# Patient Record
Sex: FEMALE | Race: BLACK OR AFRICAN AMERICAN | Employment: UNEMPLOYED | URBAN - METROPOLITAN AREA
[De-identification: names, ages, dates, MRNs, and addresses within clinical notes are randomized per-mention and may not be internally consistent; named-entity substitution may affect disease eponyms.]

---

## 2017-01-25 ENCOUNTER — TELEPHONE (OUTPATIENT)
Dept: GASTROENTEROLOGY | Age: 52
End: 2017-01-25

## 2017-01-26 ENCOUNTER — TELEPHONE (OUTPATIENT)
Dept: GASTROENTEROLOGY | Age: 52
End: 2017-01-26

## 2017-01-27 ENCOUNTER — HOSPITAL ENCOUNTER (OUTPATIENT)
Dept: SURGERY | Age: 52
Discharge: HOME | End: 2017-03-08
Attending: INTERNAL MEDICINE | Admitting: INTERNAL MEDICINE

## 2017-02-03 ENCOUNTER — TELEPHONE (OUTPATIENT)
Dept: GASTROENTEROLOGY | Age: 52
End: 2017-02-03

## 2017-02-07 ENCOUNTER — TELEPHONE (OUTPATIENT)
Dept: GASTROENTEROLOGY | Age: 52
End: 2017-02-07

## 2019-03-27 ENCOUNTER — APPOINTMENT (OUTPATIENT)
Dept: GENERAL RADIOLOGY | Age: 54
End: 2019-03-27
Payer: MEDICARE

## 2019-03-27 ENCOUNTER — HOSPITAL ENCOUNTER (EMERGENCY)
Age: 54
Discharge: HOME | End: 2019-03-27
Payer: MEDICARE

## 2019-03-27 VITALS
HEIGHT: 66 IN | DIASTOLIC BLOOD PRESSURE: 76 MMHG | SYSTOLIC BLOOD PRESSURE: 121 MMHG | WEIGHT: 120 LBS | BODY MASS INDEX: 19.29 KG/M2 | OXYGEN SATURATION: 96 % | TEMPERATURE: 99.3 F | HEART RATE: 89 BPM | RESPIRATION RATE: 18 BRPM

## 2019-03-27 DIAGNOSIS — Z72.0 TOBACCO ABUSE: ICD-10-CM

## 2019-03-27 DIAGNOSIS — R05.9 COUGH: Primary | ICD-10-CM

## 2019-03-27 PROCEDURE — 6370000000 HC RX 637 (ALT 250 FOR IP): Performed by: PHYSICIAN ASSISTANT

## 2019-03-27 PROCEDURE — 99283 EMERGENCY DEPT VISIT LOW MDM: CPT

## 2019-03-27 PROCEDURE — 94664 DEMO&/EVAL PT USE INHALER: CPT

## 2019-03-27 PROCEDURE — 6360000002 HC RX W HCPCS: Performed by: PHYSICIAN ASSISTANT

## 2019-03-27 PROCEDURE — 94761 N-INVAS EAR/PLS OXIMETRY MLT: CPT

## 2019-03-27 PROCEDURE — 71045 X-RAY EXAM CHEST 1 VIEW: CPT

## 2019-03-27 PROCEDURE — 94640 AIRWAY INHALATION TREATMENT: CPT

## 2019-03-27 RX ORDER — AZITHROMYCIN 250 MG/1
TABLET, FILM COATED ORAL
Qty: 1 PACKET | Refills: 0 | Status: SHIPPED | OUTPATIENT
Start: 2019-03-27 | End: 2019-04-06

## 2019-03-27 RX ORDER — AZITHROMYCIN 250 MG/1
TABLET, FILM COATED ORAL
Qty: 1 PACKET | Refills: 0 | Status: SHIPPED | OUTPATIENT
Start: 2019-03-27 | End: 2019-03-27 | Stop reason: SDUPTHER

## 2019-03-27 RX ORDER — IPRATROPIUM BROMIDE AND ALBUTEROL SULFATE 2.5; .5 MG/3ML; MG/3ML
1 SOLUTION RESPIRATORY (INHALATION) ONCE
Status: COMPLETED | OUTPATIENT
Start: 2019-03-27 | End: 2019-03-27

## 2019-03-27 RX ORDER — ALBUTEROL SULFATE 90 UG/1
2 AEROSOL, METERED RESPIRATORY (INHALATION) EVERY 4 HOURS PRN
Qty: 1 INHALER | Refills: 1 | Status: SHIPPED | OUTPATIENT
Start: 2019-03-27 | End: 2020-03-03

## 2019-03-27 RX ADMIN — DEXAMETHASONE 10 MG: 2 TABLET ORAL at 16:10

## 2019-03-27 RX ADMIN — IPRATROPIUM BROMIDE AND ALBUTEROL SULFATE 1 AMPULE: .5; 3 SOLUTION RESPIRATORY (INHALATION) at 16:14

## 2019-03-27 ASSESSMENT — PAIN DESCRIPTION - DESCRIPTORS: DESCRIPTORS: ACHING

## 2019-03-27 ASSESSMENT — PAIN DESCRIPTION - FREQUENCY: FREQUENCY: CONTINUOUS

## 2019-03-27 ASSESSMENT — PAIN SCALES - GENERAL: PAINLEVEL_OUTOF10: 10

## 2019-03-27 ASSESSMENT — PAIN DESCRIPTION - LOCATION: LOCATION: GENERALIZED

## 2019-03-27 ASSESSMENT — PAIN DESCRIPTION - PAIN TYPE: TYPE: ACUTE PAIN

## 2019-03-27 NOTE — ED PROVIDER NOTES
Patient Identification  Ar Roy is a 48 y.o. female    Chief Complaint  Cough      HPI  (History provided by patient)  This is a 48 y.o. female who was brought in by self for chief complaint of cough. Onset was 3 weeks ago. Cough is productive of white chalky sputum. + mild SOB. Current smoker. No CP. No NV. Did have some diarrhea when symptoms began, none now. Granddaughter sick with flu. REVIEW OF SYSTEMS    Constitutional:  + subjective fever, chills  HENT:  Denies ear pain.  + ST  Eyes: Denies vision changes, eye pain  Cardiovascular:  Denies chest pain, syncope  Respiratory:  + shortness of breath, cough   GI:  Denies abdominal pain, nausea, vomiting  :  Denies dysuria, discharge  Musculoskeletal:  Denies back pain, joint pain  Skin:  Denies rash, pruritis  Neurologic:  Denies headache, focal weakness, or sensory changes     See HPI and nursing notes for additional information     I have reviewed the following nursing documentation:  Allergies: Allergies   Allergen Reactions    Sulfa Antibiotics        Past medical history:  has a past medical history of Arthritis and Cervical dysplasia. Past surgical history:  has a past surgical history that includes knee surgery; Cervix surgery; and Cataract removal (Bilateral, 02/2019). Home medications:   Prior to Admission medications    Medication Sig Start Date End Date Taking? Authorizing Provider   albuterol sulfate HFA (PROVENTIL HFA) 108 (90 Base) MCG/ACT inhaler Inhale 2 puffs into the lungs every 4 hours as needed for Wheezing or Shortness of Breath With spacer (and mask if indicated). Thanks.  3/27/19 4/26/19 Yes Sharan Turner PA-C   azithromycin (ZITHROMAX) 250 MG tablet Take 2 tablets (500 mg) on Day 1, followed by 1 tablet (250 mg) once daily on Days 2 through 5. 3/27/19 4/6/19 Yes Sharan Turner PA-C   Multiple Vitamins-Calcium (ONE-A-DAY WOMENS PO) Take 1 tablet by mouth daily    Historical Provider, MD Social history:  reports that she has been smoking cigarettes. She has been smoking about 0.50 packs per day. She has never used smokeless tobacco. She reports that she drinks alcohol. She reports that she does not use drugs. Family history:  History reviewed. No pertinent family history. Exam  /76   Pulse 89   Temp 99.3 °F (37.4 °C) (Oral)   Resp 18   Ht 5' 5.5\" (1.664 m)   Wt 120 lb (54.4 kg)   LMP 07/02/2013   SpO2 96%   BMI 19.67 kg/m²   Nursing note and vitals reviewed. Constitutional: Well developed, well nourished. No acute distress. HENT:      Head: Normocephalic and atraumatic. Ears: External ears normal.      Nose: Nose normal.     Mouth: Membrane mucosa moist and pink. No posterior oropharynx erythema or tonsillar edema  Eyes: Anicteric sclera. No discharge, PERRL  Neck: Supple. Trachea midline. Cardiovascular: RRR, no murmurs, rubs, or gallops, radial pulses 2+ bilaterally. Pulmonary/Chest: Effort normal. No respiratory distress. + scant wheezing noted in LLL, remainder of lungs clear, no rales or stridor. Abdominal: Soft. Nontender to palpation. No distension. No guarding, rebound tenderness, or evidence of ascites. : No CVA tenderness. Musculoskeletal: Moves all extremities. No gross deformity. Neurological: Alert and oriented to person, place, and time. Normal muscle tone. Skin: Warm and dry. No rash. Psychiatric: Normal mood and affect. Behavior is normal.      Radiographs (if obtained):  [] The following radiograph was interpreted by myself in the absence of a radiologist:   [x] Radiologist's Report Reviewed:  XR CHEST PORTABLE   Final Result   No acute findings. Labs  No results found for this visit on 03/27/19. MDM  Patient presents for cough, shortness of breath. She is a current smoker. Has some wheezing on exam.  Chest x-ray negative. Given breathing treatment with some improvement.   Plan is to start on Z-Wilfredo, albuterol. Given single dose Decadron here. Clinical impression is likely a COPD exacerbation, patient does not have formal COPD diagnosis. I estimate there is LOW risk for ACUTE CORONARY SYNDROME, RESPIRATORY FAILURE/ARREST, ACUTE CONGESTIVE HEART FAILURE, CARDIAC TAMPONADE, PNEUMONIA, PNEUMOTHORAX, PERICARDITIS, PULMONARY EMBOLISM, AORTIC DISSECTION, and ARDS,  thus I consider the discharge disposition reasonable. Candie Pulido and I have discussed the diagnosis and risks, and we agree with discharging home to follow-up with their primary doctor. We also discussed returning to the Emergency Department immediately if new or worsening symptoms occur. We have discussed the symptoms which are most concerning (e.g., bloody sputum, fever, worsening pain or shortness of breath, vomiting) that necessitate immediate return. I have independently evaluated this patient. Final Impression  1. Cough    2. Tobacco abuse        Blood pressure 121/76, pulse 89, temperature 99.3 °F (37.4 °C), temperature source Oral, resp. rate 18, height 5' 5.5\" (1.664 m), weight 120 lb (54.4 kg), last menstrual period 07/02/2013, SpO2 96 %. Disposition:  Discharge to home in stable condition. Patient was given scripts for the following medications. I counseled patient how to take these medications. New Prescriptions    ALBUTEROL SULFATE HFA (PROVENTIL HFA) 108 (90 BASE) MCG/ACT INHALER    Inhale 2 puffs into the lungs every 4 hours as needed for Wheezing or Shortness of Breath With spacer (and mask if indicated). Thanks. AZITHROMYCIN (ZITHROMAX) 250 MG TABLET    Take 2 tablets (500 mg) on Day 1, followed by 1 tablet (250 mg) once daily on Days 2 through 5. This chart was generated using the [employer] dictation system. I created this record but it may contain dictation errors given the limitations of this technology.         Carissa Marshall PA-C  03/27/19 [id number]

## 2019-03-27 NOTE — ED NOTES
resp in to do breathing treatment at this time       Emerita Gillis, [facility]  03/27/19 [id number]

## 2020-03-03 ENCOUNTER — OFFICE VISIT (OUTPATIENT)
Dept: CARDIOLOGY CLINIC | Age: 55
End: 2020-03-03
Payer: MEDICARE

## 2020-03-03 VITALS
HEART RATE: 92 BPM | SYSTOLIC BLOOD PRESSURE: 136 MMHG | WEIGHT: 124.6 LBS | HEIGHT: 65 IN | DIASTOLIC BLOOD PRESSURE: 86 MMHG | BODY MASS INDEX: 20.76 KG/M2

## 2020-03-03 PROCEDURE — 99203 OFFICE O/P NEW LOW 30 MIN: CPT | Performed by: INTERNAL MEDICINE

## 2020-03-03 PROCEDURE — 93000 ELECTROCARDIOGRAM COMPLETE: CPT | Performed by: INTERNAL MEDICINE

## 2020-03-03 NOTE — LETTER
CLINICAL STAFF DOCUMENTATION    Mary Kate Mcmahon [facility]  1965  [id number]    Have you had any Chest Pain - No  If Yes DO EKG - How does it feel -    How long does the pain last -    How long have you been having the pain -    Did you take a    And did it relieve the pain -     Have you had any Shortness of Breath - No  If Yes - When     Have you had any dizziness - No  If Yes DO ORTHOSTATIC BP - when do you feel dizzy    How long does it last     Have you had any palpitations - No  If Yes DO EKG - Do you feel your heart   How long does it last -      Is the patient on any of the following medications -   If Yes DO EKG    Do you have any edema - swelling in     If Yes - CHECK TO SEE IF THE EDEMA IS PITTING  How long have they been having edema -   If Yes - Have they worn compression stockings     Check Venous \"LEG PROBLEM Questionnaire\"    Do you have a surgery or procedure scheduled in the near future - No  If Yes- DO EKG  If Yes - Who is the surgery with?    Phone number of physician   Fax number of physician   Type of surgery   BE SURE TO GIVE THIS INFORMATION to [facility]    Ask patient if they want to sign up for [employer] if they are not already signed up    Check to see if we have an E-MAIL on file for the patient    Check medication list thoroughly!!!  BE SURE TO ASK PATIENT IF THEY NEED MEDICATION REFILLS

## 2020-03-03 NOTE — PROGRESS NOTES
Negative for muscle pain, muscular weakness, negative for pain in arm and leg or swelling in foot and leg  Neurological: Negative for dizziness, headaches, memory loss, numbness/tingling, visual changes, syncope  Dermatological: Negative for rash    Objective:  /86   Pulse 92   Ht 5' 5\" (1.651 m)   Wt 124 lb 9.6 oz (56.5 kg)   LMP 07/02/2013   BMI 20.73 kg/m²   Wt Readings from Last 3 Encounters:   03/03/20 124 lb 9.6 oz (56.5 kg)   03/27/19 120 lb (54.4 kg)   02/03/17 123 lb (55.8 kg)     Body mass index is 20.73 kg/m². GENERAL - Alert, oriented, pleasant, in no apparent distress. EYES: No jaundice, no conjunctival pallor. SKIN: It is warm & dry. No rashes. No Echhymosis    HEENT - No clinically significant abnormalities seen. Neck - Supple. No jugular venous distention noted. No carotid bruits. Cardiovascular - Normal S1 and S2 without obvious murmur or gallop. Extremities - No cyanosis, clubbing, or significant edema. Pulmonary - No respiratory distress. No wheezes or rales. Abdomen - No masses, tenderness, or organomegaly. Musculoskeletal - No significant edema. No joint deformities. No muscle wasting. Neurologic - Cranial nerves II through XII are grossly intact. There were no gross focal neurologic abnormalities.     Lab Review   No results found for: CKTOTAL, CKMB, CKMBINDEX, TROPONINT  BNP:  No results found for: BNP  PT/INR:  No results found for: INR  No results found for: LABA1C  Lab Results   Component Value Date    WBC 8.9 07/26/2014    HCT 39.2 07/26/2014    MCV 93.9 07/26/2014     07/26/2014     No results found for: CHOL, TRIG, HDL, LDLCALC, LDLDIRECT, CHOLHDLRATIO  Lab Results   Component Value Date    ALT 15 07/26/2014    AST 23 07/26/2014     BMP:    Lab Results   Component Value Date     07/26/2014    K 4.1 07/26/2014     07/26/2014    CO2 28 07/26/2014    BUN 12 07/26/2014    CREATININE 0.9 07/26/2014     CMP:   Lab Results   Component Value Date  07/26/2014    K 4.1 07/26/2014     07/26/2014    CO2 28 07/26/2014    BUN 12 07/26/2014    PROT 6.8 07/26/2014     TSH:  No results found for: TSH, TSHHS        Impression:    1. Circulation problem       There is no problem list on file for this patient.       Assessment & Plan:    - PVD :  Art doppler  BIJAL Sharif  [facility]

## 2020-03-05 ENCOUNTER — TELEPHONE (OUTPATIENT)
Dept: CARDIOLOGY CLINIC | Age: 55
End: 2020-03-05

## 2020-03-05 NOTE — TELEPHONE ENCOUNTER
Called and lm for the patient to call the office for her lower ext art nenita appointment that is needed .    [employer] sharan Hines Epp is needed

## 2020-03-20 ENCOUNTER — PROCEDURE VISIT (OUTPATIENT)
Dept: CARDIOLOGY CLINIC | Age: 55
End: 2020-03-20
Payer: MEDICARE

## 2020-03-20 PROCEDURE — 93922 UPR/L XTREMITY ART 2 LEVELS: CPT | Performed by: INTERNAL MEDICINE

## 2020-03-20 PROCEDURE — 93926 LOWER EXTREMITY STUDY: CPT | Performed by: INTERNAL MEDICINE

## 2020-03-23 ENCOUNTER — TELEPHONE (OUTPATIENT)
Dept: CARDIOLOGY CLINIC | Age: 55
End: 2020-03-23

## 2020-03-23 NOTE — TELEPHONE ENCOUNTER
Advised patient of results. Patient voiced understanding. No evidence of significant occlusive arterial disease. Normal left lower extremity arterial Doppler ultrasound. Normal bilateral lower extremity ankle brachial indices.

## 2020-04-09 ENCOUNTER — TELEMEDICINE (OUTPATIENT)
Dept: CARDIOLOGY CLINIC | Age: 55
End: 2020-04-09
Payer: MEDICARE

## 2020-04-09 VITALS — WEIGHT: 123 LBS | HEIGHT: 66 IN | BODY MASS INDEX: 19.77 KG/M2

## 2020-04-09 PROCEDURE — 99213 OFFICE O/P EST LOW 20 MIN: CPT | Performed by: INTERNAL MEDICINE

## 2020-04-09 NOTE — PROGRESS NOTES
CARDIOLOGY NOTE      4/9/2020    RE: Miriam Pulido  (1965)                               TO:  Dr. aMrtín Berry primary care provider on file. Lucrecia Dunham is a 47 y.o. female who was seen today for management of  pvd                             Here for fu on pvd testing       HPI:                   The patient does not have cardiac complaints    Carline Vazquez has the following history recorded in care path: There are no active problems to display for this patient. Current Outpatient Medications   Medication Sig Dispense Refill    Multiple Vitamins-Calcium (ONE-A-DAY WOMENS PO) Take 1 tablet by mouth daily       No current facility-administered medications for this visit. Allergies: Sulfa antibiotics  Past Medical History:   Diagnosis Date    Arthritis     Cervical dysplasia     Hx of Venous Doppler ultrasound 03/20/2020    No evidence of significant occlusive arterial disease, Normal bilateral lower extremity ankle brachial indices     Past Surgical History:   Procedure Laterality Date    CATARACT REMOVAL Bilateral 02/2019    CERVIX SURGERY      KNEE SURGERY        As reviewed History reviewed. No pertinent family history. Social History     Tobacco Use    Smoking status: Current Every Day Smoker     Packs/day: 0.50     Types: Cigarettes    Smokeless tobacco: Never Used   Substance Use Topics    Alcohol use: Yes     Comment: OCC 3x a wk*/caffeine 2 cups of coffee a day and 1 pop a day      Review of Systems:    Constitutional: Negative for diaphoresis and fatigue  Psychological:Negative for anxiety or depression  HENT: Negative for headaches, nasal congestion, sinus pain or vertigo  Eyes: Negative for visual disturbance.    Endocrine: Negative for polydipsia/polyuria  Respiratory: Negative for shortness of breath  Cardiovascular: Negative for chest pain, dyspnea on exertion, claudication, edema, irregular heartbeat, murmur, palpitations or shortness of breath  Gastrointestinal: Negative for abdominal pain or heartburn  Genito-Urinary: Negative for urinary frequency/urgency  Musculoskeletal: Negative for muscle pain, muscular weakness, negative for pain in arm and leg or swelling in foot and leg  Neurological: Negative for dizziness, headaches, memory loss, numbness/tingling, visual changes, syncope  Dermatological: Negative for rash    Objective:  Ht 5' 5.5\" (1.664 m)   Wt 123 lb (55.8 kg)   LMP 07/02/2013   BMI 20.16 kg/m²   Wt Readings from Last 3 Encounters:   04/09/20 123 lb (55.8 kg)   03/03/20 124 lb 9.6 oz (56.5 kg)   03/27/19 120 lb (54.4 kg)     Body mass index is 20.16 kg/m². GENERAL - Alert, oriented, pleasant, in no apparent distress. EYES: No jaundice, no conjunctival pallor. SKIN: It is warm & dry. No rashes. No Echhymosis    HEENT - No clinically significant abnormalities seen. Neck - No jugular venous distention noted. .   Neurologic - AxO X3    Lab Review   No results found for: CKTOTAL, CKMB, CKMBINDEX, TROPONINT  BNP:  No results found for: BNP  PT/INR:  No results found for: INR  No results found for: LABA1C  Lab Results   Component Value Date    WBC 8.9 07/26/2014    HCT 39.2 07/26/2014    MCV 93.9 07/26/2014     07/26/2014     No results found for: CHOL, TRIG, HDL, LDLCALC, LDLDIRECT, CHOLHDLRATIO  Lab Results   Component Value Date    ALT 15 07/26/2014    AST 23 07/26/2014     BMP:    Lab Results   Component Value Date     07/26/2014    K 4.1 07/26/2014     07/26/2014    CO2 28 07/26/2014    BUN 12 07/26/2014    CREATININE 0.9 07/26/2014     CMP:   Lab Results   Component Value Date     07/26/2014    K 4.1 07/26/2014     07/26/2014    CO2 28 07/26/2014    BUN 12 07/26/2014    PROT 6.8 07/26/2014     TSH:  No results found for: TSH, TSHHS      Impression:    No diagnosis found. There is no problem list on file for this patient.       Assessment & Plan:    - PVD  Normal art doppler       I confirm that

## 2020-04-10 ENCOUNTER — TELEPHONE (OUTPATIENT)
Dept: CARDIOLOGY CLINIC | Age: 55
End: 2020-04-10

## 2020-04-10 NOTE — TELEPHONE ENCOUNTER
Pt had VV yesterday w/Dr. Klein Mins. Pt states she's to have a R knee replacement w/Dr. Paula Craig ASAP when it's safe (COVID-19). Pt was advised that we will need Dr. Julian Calle office to send us a cardiac clearance request. Pt verbalized understanding.